# Patient Record
(demographics unavailable — no encounter records)

---

## 2024-11-11 NOTE — REVIEW OF SYSTEMS
[Heartburn] : heartburn [Joint Pain] : joint pain [Anxiety] : anxiety [Depression] : depression [Negative] : Heme/Lymph [FreeTextEntry9] : left shoulder and right writ

## 2024-11-11 NOTE — ASSESSMENT
[FreeTextEntry1] : , , Preventative: Counseled on health promotion and disease prevention. EKG and wellness labs done Gastroenterology referral provided for colonoscopy FH of Esophageal cancer: Follow-up with Gastro Heart Screen:  EKG nsr PSA screen  Sholder pain ./ Frozen shoulder  P.T referral -Stretching / strengthening  -Ice / Heat, Sports cream -NSAIDS and or acetaminophen  Radiculopathy: Carpal tunnel vs other etiology  wrist brace activity modification.  advised nerve conduction studies if not improving  Anxiety and depression: Counseling / therapy exercise on Clonidine  GERD: Counseled on dietary modifications. Avoid tomato products, mint, citrus (drinks / fruit), fried fatty foods, caffeine, chocolate. Avoid food / drink ,2 hours prior to bedtime / napping -Omeprazole -Alphonso / Rolaids PRN -Gastro eval / H Pylori testing if not improving   HLD: Counseled on diet, exercise and lifestyle modifications. Advised a diet centered around whole plant based foods.  Vegetables, fruits, legumes, grains, nuts.  Advised limiting intake of refined processed foods (refined white flour products, refined sugar, soda, juice).  Limit intake of meat, dairy, eggs, oil.